# Patient Record
Sex: FEMALE | Race: WHITE | NOT HISPANIC OR LATINO
[De-identification: names, ages, dates, MRNs, and addresses within clinical notes are randomized per-mention and may not be internally consistent; named-entity substitution may affect disease eponyms.]

---

## 2019-11-08 ENCOUNTER — APPOINTMENT (OUTPATIENT)
Dept: GASTROENTEROLOGY | Facility: CLINIC | Age: 74
End: 2019-11-08
Payer: MEDICARE

## 2019-11-08 VITALS
BODY MASS INDEX: 23.9 KG/M2 | HEIGHT: 64 IN | HEART RATE: 78 BPM | SYSTOLIC BLOOD PRESSURE: 148 MMHG | DIASTOLIC BLOOD PRESSURE: 72 MMHG | WEIGHT: 140 LBS

## 2019-11-08 DIAGNOSIS — Z12.11 ENCOUNTER FOR SCREENING FOR MALIGNANT NEOPLASM OF COLON: ICD-10-CM

## 2019-11-08 PROBLEM — Z00.00 ENCOUNTER FOR PREVENTIVE HEALTH EXAMINATION: Status: ACTIVE | Noted: 2019-11-08

## 2019-11-08 PROCEDURE — 99204 OFFICE O/P NEW MOD 45 MIN: CPT

## 2019-11-08 NOTE — PHYSICAL EXAM
[General Appearance - Alert] : alert [General Appearance - In No Acute Distress] : in no acute distress [Sclera] : the sclera and conjunctiva were normal [Outer Ear] : the ears and nose were normal in appearance [] : no respiratory distress [Neck Appearance] : the appearance of the neck was normal [Abnormal Walk] : normal gait [Skin Color & Pigmentation] : normal skin color and pigmentation [Cranial Nerves] : cranial nerves 2-12 were intact [Oriented To Time, Place, And Person] : oriented to person, place, and time

## 2019-11-08 NOTE — ASSESSMENT
[FreeTextEntry1] : Will plan on an upper endoscopy for GERD.  Explained risks/benefits/alternatives including not limited to bleeding, infection, perforation, missed lesions, anesthesia complications.  Patient understands and agrees, all questions answered.\par \par Will plan on a colonoscopy for screening.  Explained risks/benefits/alternatives including not limited to bleeding, infection, perforation, missed lesions, anesthesia complications.  Patient understands and agrees, all questions answered.  Will use a split dose miralax/gatorade prep with clears the day prior.\par \par May need referral to Dr. Mathew based on EGD findings for evaluation of her dysphagia.

## 2019-11-08 NOTE — HISTORY OF PRESENT ILLNESS
[FreeTextEntry1] : Ms. Zuniga is a pleasant 74F no significant PMHx who presents to [facility] care.\balta whipple Has had a long h/o GERD, for which she has had two previous upper endoscopies, most recent more than 10 years ago, was told it was "normal." Has not taken anything for these symptoms.  Recently, over the past month or two, she has been awakening at night with regurgitation of stomach acid, this will wake her from sleep.  It will improve when she gets up and walks around.  She has associated heartburn with this.  No N/V, does complain of dysphagia occasionally with a sensation of food sticking in her upper throat.\par \par No weight loss.  No diarrhea or constipation, no rectal bleeding or black stool.\balta whipple Has never had a colonoscopy or other forms of colon cancer screening.\balta \par No NSAIDs or aspirin, has tried aspirin previously which has given her an upset stomach.\balta whipple Has not seen an ENT for her upper GI/airway symptoms.

## 2019-11-25 ENCOUNTER — RESULT REVIEW (OUTPATIENT)
Age: 74
End: 2019-11-25

## 2019-11-26 ENCOUNTER — APPOINTMENT (OUTPATIENT)
Dept: GASTROENTEROLOGY | Facility: HOSPITAL | Age: 74
End: 2019-11-26

## 2019-11-26 ENCOUNTER — OTHER (OUTPATIENT)
Age: 74
End: 2019-11-26

## 2023-12-26 ENCOUNTER — APPOINTMENT (OUTPATIENT)
Dept: GASTROENTEROLOGY | Facility: CLINIC | Age: 78
End: 2023-12-26
Payer: MEDICARE

## 2023-12-26 VITALS
HEIGHT: 64 IN | SYSTOLIC BLOOD PRESSURE: 148 MMHG | DIASTOLIC BLOOD PRESSURE: 70 MMHG | HEART RATE: 72 BPM | OXYGEN SATURATION: 98 % | BODY MASS INDEX: 24.07 KG/M2 | WEIGHT: 141 LBS

## 2023-12-26 DIAGNOSIS — K21.9 GASTRO-ESOPHAGEAL REFLUX DISEASE W/OUT ESOPHAGITIS: ICD-10-CM

## 2023-12-26 PROCEDURE — 99214 OFFICE O/P EST MOD 30 MIN: CPT

## 2023-12-26 RX ORDER — OMEPRAZOLE 20 MG/1
20 CAPSULE, DELAYED RELEASE ORAL
Qty: 60 | Refills: 1 | Status: ACTIVE | COMMUNITY
Start: 2023-12-26 | End: 1900-01-01

## 2023-12-26 RX ORDER — OMEPRAZOLE 20 MG/1
20 CAPSULE, DELAYED RELEASE ORAL
Qty: 60 | Refills: 1 | Status: DISCONTINUED | COMMUNITY
Start: 2019-11-26 | End: 2023-12-26

## 2023-12-26 RX ORDER — CHROMIUM 200 MCG
TABLET ORAL
Refills: 0 | Status: ACTIVE | COMMUNITY

## 2023-12-26 NOTE — ASSESSMENT
[FreeTextEntry1] : Likely GERD, r/o hiatal hernia. Will obtain an x-ray esophagram. Start omeprazole 20mg daily.  If unremarkable, would obtain a gastric emptying study.  Will be due for a colonoscopy in 2024 due to h/o polyp, will likely repeat an EGD at that time pending the above workup.

## 2023-12-26 NOTE — HISTORY OF PRESENT ILLNESS
[FreeTextEntry1] : Ms. Zuniga is a pleasant 78F h/o GERD, HTn, HLD, pre-DM who returns for f/u. Last seen 11/19 when she underwent an EGD/colonoscopy showing fundic gland polyps, esophageal biopsies c/w reflux, 1 sub-cm colonic tubular adenoma, int hemorrhoids.  Over the past 4 months or so she has had bothersome regurgitation mainly in the evenings, often waking her from sleep. She has had to sleep on a recliner which has helped. No symptoms during the day, progress mainly in the evening only. No overt heartburn, mainly just regurgitation. Does get occasional dull pain or soreness in her epigastric area. She has been trying to eat small meals throughout the day, has changed her diet, unsure if this has helped however has lost a few pounds. No dysphagia, odynophagia. Had an episode of diarrhea that lasted approximately 4 weeks several months ago, resolved, had stool studies which as per her report were unremarkable. No fevers or chills. Tried pantoprazole for 2 weeks or so, thinks her symptoms worsened. States was told in the very distant past she had a hiatal hernia.  Does not smoke, stopped drinking. Used to have a glass of wine in the evenings occasionally.

## 2024-01-05 DIAGNOSIS — K80.20 CALCULUS OF GALLBLADDER W/OUT CHOLECYSTITIS W/OUT OBSTRUCTION: ICD-10-CM

## 2024-01-12 ENCOUNTER — RESULT REVIEW (OUTPATIENT)
Age: 79
End: 2024-01-12

## 2024-03-21 ENCOUNTER — RX RENEWAL (OUTPATIENT)
Age: 79
End: 2024-03-21

## 2024-05-17 RX ORDER — SODIUM SULFATE, POTASSIUM SULFATE AND MAGNESIUM SULFATE 1.6; 3.13; 17.5 G/177ML; G/177ML; G/177ML
17.5-3.13-1.6 SOLUTION ORAL
Qty: 1 | Refills: 0 | Status: ACTIVE | COMMUNITY
Start: 2024-05-17 | End: 1900-01-01

## 2024-06-05 RX ORDER — FAMOTIDINE 40 MG/1
40 TABLET, FILM COATED ORAL
Qty: 30 | Refills: 1 | Status: ACTIVE | COMMUNITY
Start: 2024-01-18 | End: 1900-01-01

## 2024-06-25 ENCOUNTER — RESULT REVIEW (OUTPATIENT)
Age: 79
End: 2024-06-25

## 2024-06-26 ENCOUNTER — APPOINTMENT (OUTPATIENT)
Dept: GASTROENTEROLOGY | Facility: HOSPITAL | Age: 79
End: 2024-06-26

## 2024-08-06 ENCOUNTER — RX RENEWAL (OUTPATIENT)
Age: 79
End: 2024-08-06

## 2024-08-12 ENCOUNTER — NON-APPOINTMENT (OUTPATIENT)
Age: 79
End: 2024-08-12

## 2024-08-13 ENCOUNTER — APPOINTMENT (OUTPATIENT)
Dept: GASTROENTEROLOGY | Facility: CLINIC | Age: 79
End: 2024-08-13

## 2024-08-13 VITALS
HEART RATE: 83 BPM | SYSTOLIC BLOOD PRESSURE: 150 MMHG | WEIGHT: 144 LBS | OXYGEN SATURATION: 96 % | DIASTOLIC BLOOD PRESSURE: 68 MMHG | BODY MASS INDEX: 24.59 KG/M2 | HEIGHT: 64 IN

## 2024-08-13 DIAGNOSIS — K21.9 GASTRO-ESOPHAGEAL REFLUX DISEASE W/OUT ESOPHAGITIS: ICD-10-CM

## 2024-08-13 DIAGNOSIS — K80.20 CALCULUS OF GALLBLADDER W/OUT CHOLECYSTITIS W/OUT OBSTRUCTION: ICD-10-CM

## 2024-08-13 PROCEDURE — 99214 OFFICE O/P EST MOD 30 MIN: CPT

## 2024-08-13 NOTE — ASSESSMENT
[FreeTextEntry1] : Unclear if the symptoms are due to acid related disease or gallstones. Will restart omeprazole 20mg in the evening and stop famotidine. If no improvement after several weeks, will refer to surgery for evaluation for cholecystectomy.

## 2024-08-13 NOTE — HISTORY OF PRESENT ILLNESS
[FreeTextEntry1] : Ms. Zuniga is a 79F h/o GERD, gallstones who returns for f/u. She has been c/o bothersome pain in her epigastric area that wakes her from sleep at 1:30 AM every night, keeps her up for around 1 hour. No radiation. No heartburn, regurgitation. Does not occur after eating or other times in the day. Felt better previously while on omeprazole, thinks it got worse when changing to famotidine, unsure. Has been ongoing since 12/23.  U/S abd 1/24: fatty liver, gallstones  EGD/colonoscopy with myself 6/26/24: - duodenal erosions (unremarkable biopsies) - 1 mm cecal tubular adenoma - sigmoid hyperplastic polyps - diverticulosis - int hemorrhoids

## 2024-09-05 ENCOUNTER — NON-APPOINTMENT (OUTPATIENT)
Age: 79
End: 2024-09-05

## 2024-09-12 ENCOUNTER — APPOINTMENT (OUTPATIENT)
Dept: SURGERY | Facility: CLINIC | Age: 79
End: 2024-09-12
Payer: MEDICARE

## 2024-09-12 VITALS
HEIGHT: 64 IN | TEMPERATURE: 98.2 F | HEART RATE: 87 BPM | SYSTOLIC BLOOD PRESSURE: 149 MMHG | DIASTOLIC BLOOD PRESSURE: 57 MMHG | BODY MASS INDEX: 24.21 KG/M2 | OXYGEN SATURATION: 97 % | WEIGHT: 141.8 LBS

## 2024-09-12 DIAGNOSIS — K80.20 CALCULUS OF GALLBLADDER W/OUT CHOLECYSTITIS W/OUT OBSTRUCTION: ICD-10-CM

## 2024-09-12 PROCEDURE — 99203 OFFICE O/P NEW LOW 30 MIN: CPT

## 2024-09-12 NOTE — PHYSICAL EXAM
[de-identified] : Patient looks well and does not appear her stated age [de-identified] : Her eyes are not jaundiced [de-identified] : Her abdomen is soft nontender she has well-healed laparoscopy incisions

## 2024-09-12 NOTE — DATA REVIEWED
[FreeTextEntry1] : I looked at the actual images of the ultrasound as well as the upper and lower endoscopy  Laboratory tests are normal

## 2024-09-12 NOTE — HISTORY OF PRESENT ILLNESS
[de-identified] : Patient is a 79-year-old woman who looks remarkably well for her age who is in relatively good condition who is complaining of epigastric pressure that feels like somebody has kicked her in the stomach at the subxiphoid area.  It happens at 1:30 AM and wakes her up from sleep she usually eats dinner around 6-7 but sometimes as late as 8  Other times a day she does not seem to have this pain has never had any jaundice never had any pancreatitis and she had a recent upper and lower endoscopy that did not yield any other culprits for this epigastric pain  Patient spends her mid velasco in [place] and last year had her appendix taken out down there this was done laparoscopically she is also had carpal tunnel surgery and orthopedic surgery for a fall on her left arm.   Her recent LFTs done during her physical exam at [facility] were normal

## 2024-09-12 NOTE — REASON FOR VISIT
[Consultation] : a consultation visit [FreeTextEntry1] : Patient referred by Dr. Nilesh Haney for the evaluation of symptomatic cholelithiasis

## 2024-09-12 NOTE — PLAN
[FreeTextEntry1] : Patient with symptomatic cholelithiasis I have recommended laparoscopic cholecystectomy  She is going to think about it but she certainly understands what my recommendation is and I will see her back as needed or at the time of surgery based on her choice  I spent 30 minutes with the patient and reviewing her records and documenting my findings

## 2024-09-12 NOTE — ASSESSMENT
[FreeTextEntry1] : My impression is this patient has symptomatic cholelithiasis.  Her symptoms are not textbook but they really are she seems to me on inspection of the ultrasound that she has a single large stone with may be a smaller but still large stone so I think her chances of passing stones is low  She seems like she wants to think about whether or not she wants to have her gallbladder taken out electively as she thinks her symptoms have gone away for the time being I did talk to her about emergency surgery and acute cholecystitis but she is going to make up her own mind after she gets back from [place] at the end of the month  I do not think she needs any further imaging and she does have recent liver function tests and no suspicious findings so I believe we could proceed with surgery if she desires it

## 2024-10-01 ENCOUNTER — RX RENEWAL (OUTPATIENT)
Age: 79
End: 2024-10-01

## 2024-12-09 ENCOUNTER — NON-APPOINTMENT (OUTPATIENT)
Age: 79
End: 2024-12-09

## 2025-01-02 ENCOUNTER — APPOINTMENT (OUTPATIENT)
Dept: SURGERY | Facility: CLINIC | Age: 80
End: 2025-01-02
Payer: MEDICARE

## 2025-01-02 ENCOUNTER — NON-APPOINTMENT (OUTPATIENT)
Age: 80
End: 2025-01-02

## 2025-01-02 VITALS
DIASTOLIC BLOOD PRESSURE: 60 MMHG | SYSTOLIC BLOOD PRESSURE: 170 MMHG | TEMPERATURE: 98.1 F | HEART RATE: 90 BPM | OXYGEN SATURATION: 96 %

## 2025-01-02 DIAGNOSIS — K80.20 CALCULUS OF GALLBLADDER W/OUT CHOLECYSTITIS W/OUT OBSTRUCTION: ICD-10-CM

## 2025-01-02 PROCEDURE — 99213 OFFICE O/P EST LOW 20 MIN: CPT

## 2025-01-03 LAB
ALBUMIN SERPL ELPH-MCNC: 4.4 G/DL
ALP BLD-CCNC: 89 U/L
ALT SERPL-CCNC: 12 U/L
ANION GAP SERPL CALC-SCNC: 10 MMOL/L
AST SERPL-CCNC: 14 U/L
BILIRUB SERPL-MCNC: 0.3 MG/DL
BUN SERPL-MCNC: 13 MG/DL
CALCIUM SERPL-MCNC: 9.2 MG/DL
CHLORIDE SERPL-SCNC: 105 MMOL/L
CO2 SERPL-SCNC: 25 MMOL/L
CREAT SERPL-MCNC: 0.92 MG/DL
EGFR: 63 ML/MIN/1.73M2
GLUCOSE SERPL-MCNC: 101 MG/DL
HCT VFR BLD CALC: 47.3 %
HGB BLD-MCNC: 14.5 G/DL
LPL SERPL-CCNC: 49 U/L
MCHC RBC-ENTMCNC: 27.6 PG
MCHC RBC-ENTMCNC: 30.7 G/DL
MCV RBC AUTO: 89.9 FL
PLATELET # BLD AUTO: 271 K/UL
POTASSIUM SERPL-SCNC: 4.1 MMOL/L
PROT SERPL-MCNC: 7 G/DL
RBC # BLD: 5.26 M/UL
RBC # FLD: 14.2 %
SODIUM SERPL-SCNC: 141 MMOL/L
WBC # FLD AUTO: 10.66 K/UL

## 2025-01-15 ENCOUNTER — NON-APPOINTMENT (OUTPATIENT)
Age: 80
End: 2025-01-15

## 2025-01-21 ENCOUNTER — NON-APPOINTMENT (OUTPATIENT)
Age: 80
End: 2025-01-21

## 2025-01-22 ENCOUNTER — RESULT REVIEW (OUTPATIENT)
Age: 80
End: 2025-01-22

## 2025-01-31 ENCOUNTER — TRANSCRIPTION ENCOUNTER (OUTPATIENT)
Age: 80
End: 2025-01-31

## 2025-01-31 ENCOUNTER — APPOINTMENT (OUTPATIENT)
Dept: SURGERY | Facility: HOSPITAL | Age: 80
End: 2025-01-31

## 2025-01-31 ENCOUNTER — RESULT REVIEW (OUTPATIENT)
Age: 80
End: 2025-01-31

## 2025-02-10 ENCOUNTER — NON-APPOINTMENT (OUTPATIENT)
Age: 80
End: 2025-02-10

## 2025-02-12 ENCOUNTER — RX RENEWAL (OUTPATIENT)
Age: 80
End: 2025-02-12

## 2025-02-19 ENCOUNTER — APPOINTMENT (OUTPATIENT)
Dept: SURGERY | Facility: CLINIC | Age: 80
End: 2025-02-19
Payer: MEDICARE

## 2025-02-19 VITALS
TEMPERATURE: 98 F | DIASTOLIC BLOOD PRESSURE: 61 MMHG | SYSTOLIC BLOOD PRESSURE: 164 MMHG | OXYGEN SATURATION: 97 % | HEART RATE: 77 BPM

## 2025-02-19 DIAGNOSIS — K80.20 CALCULUS OF GALLBLADDER W/OUT CHOLECYSTITIS W/OUT OBSTRUCTION: ICD-10-CM

## 2025-02-19 PROCEDURE — 99024 POSTOP FOLLOW-UP VISIT: CPT

## 2025-03-04 ENCOUNTER — NON-APPOINTMENT (OUTPATIENT)
Age: 80
End: 2025-03-04

## 2025-03-04 RX ORDER — SUCRALFATE 1 G/10ML
1 SUSPENSION ORAL EVERY 6 HOURS
Qty: 1 | Refills: 0 | Status: ACTIVE | COMMUNITY
Start: 2025-03-04 | End: 1900-01-01

## 2025-03-24 ENCOUNTER — RESULT REVIEW (OUTPATIENT)
Age: 80
End: 2025-03-24

## 2025-03-25 LAB
ALBUMIN SERPL ELPH-MCNC: 4.2 G/DL
ALP BLD-CCNC: 86 U/L
ALT SERPL-CCNC: 12 U/L
ANION GAP SERPL CALC-SCNC: 10 MMOL/L
AST SERPL-CCNC: 13 U/L
BILIRUB SERPL-MCNC: 0.4 MG/DL
BUN SERPL-MCNC: 10 MG/DL
CALCIUM SERPL-MCNC: 9.4 MG/DL
CHLORIDE SERPL-SCNC: 105 MMOL/L
CO2 SERPL-SCNC: 26 MMOL/L
CREAT SERPL-MCNC: 1 MG/DL
EGFRCR SERPLBLD CKD-EPI 2021: 57 ML/MIN/1.73M2
GLUCOSE SERPL-MCNC: 103 MG/DL
LPL SERPL-CCNC: 44 U/L
POTASSIUM SERPL-SCNC: 4.2 MMOL/L
PROT SERPL-MCNC: 7.1 G/DL
SODIUM SERPL-SCNC: 141 MMOL/L

## 2025-04-02 ENCOUNTER — TRANSCRIPTION ENCOUNTER (OUTPATIENT)
Age: 80
End: 2025-04-02

## 2025-04-02 ENCOUNTER — APPOINTMENT (OUTPATIENT)
Dept: SURGERY | Facility: CLINIC | Age: 80
End: 2025-04-02
Payer: MEDICARE

## 2025-04-02 VITALS
TEMPERATURE: 97.8 F | SYSTOLIC BLOOD PRESSURE: 143 MMHG | DIASTOLIC BLOOD PRESSURE: 68 MMHG | HEART RATE: 78 BPM | OXYGEN SATURATION: 98 %

## 2025-04-02 DIAGNOSIS — K21.9 GASTRO-ESOPHAGEAL REFLUX DISEASE W/OUT ESOPHAGITIS: ICD-10-CM

## 2025-04-02 PROCEDURE — 99024 POSTOP FOLLOW-UP VISIT: CPT

## 2025-04-19 ENCOUNTER — NON-APPOINTMENT (OUTPATIENT)
Age: 80
End: 2025-04-19

## 2025-04-22 ENCOUNTER — APPOINTMENT (OUTPATIENT)
Dept: UROLOGY | Facility: CLINIC | Age: 80
End: 2025-04-22
Payer: MEDICARE

## 2025-04-22 VITALS — HEIGHT: 64 IN | BODY MASS INDEX: 23.22 KG/M2 | WEIGHT: 136 LBS

## 2025-04-22 DIAGNOSIS — N20.0 CALCULUS OF KIDNEY: ICD-10-CM

## 2025-04-22 PROCEDURE — 99204 OFFICE O/P NEW MOD 45 MIN: CPT

## 2025-04-22 RX ORDER — TAMSULOSIN HYDROCHLORIDE 0.4 MG/1
0.4 CAPSULE ORAL
Qty: 30 | Refills: 0 | Status: ACTIVE | COMMUNITY
Start: 2025-04-22 | End: 1900-01-01

## 2025-04-22 RX ORDER — CRANBERRY FRUIT 1000 MG
1000 CAPSULE ORAL
Refills: 0 | Status: ACTIVE | COMMUNITY

## 2025-04-22 RX ORDER — AMLODIPINE BESYLATE AND BENAZEPRIL HYDROCHLORIDE 2.5; 1 MG/1; MG/1
2.5-1 CAPSULE ORAL
Refills: 0 | Status: ACTIVE | COMMUNITY

## 2025-04-23 LAB
ANION GAP SERPL CALC-SCNC: 15 MMOL/L
BUN SERPL-MCNC: 9 MG/DL
CALCIUM SERPL-MCNC: 9.6 MG/DL
CHLORIDE SERPL-SCNC: 105 MMOL/L
CO2 SERPL-SCNC: 22 MMOL/L
CREAT SERPL-MCNC: 1 MG/DL
EGFRCR SERPLBLD CKD-EPI 2021: 57 ML/MIN/1.73M2
GLUCOSE SERPL-MCNC: 99 MG/DL
HCT VFR BLD CALC: 47.2 %
HGB BLD-MCNC: 14.9 G/DL
MCHC RBC-ENTMCNC: 27.7 PG
MCHC RBC-ENTMCNC: 31.6 G/DL
MCV RBC AUTO: 87.9 FL
PLATELET # BLD AUTO: 304 K/UL
POTASSIUM SERPL-SCNC: 4.2 MMOL/L
RBC # BLD: 5.37 M/UL
RBC # FLD: 14.1 %
SODIUM SERPL-SCNC: 143 MMOL/L
WBC # FLD AUTO: 12.05 K/UL

## 2025-04-24 ENCOUNTER — RESULT REVIEW (OUTPATIENT)
Age: 80
End: 2025-04-24

## 2025-04-25 ENCOUNTER — NON-APPOINTMENT (OUTPATIENT)
Age: 80
End: 2025-04-25

## 2025-04-29 ENCOUNTER — NON-APPOINTMENT (OUTPATIENT)
Age: 80
End: 2025-04-29

## 2025-06-16 ENCOUNTER — APPOINTMENT (OUTPATIENT)
Dept: GASTROENTEROLOGY | Facility: CLINIC | Age: 80
End: 2025-06-16
Payer: MEDICARE

## 2025-06-16 VITALS
BODY MASS INDEX: 23.56 KG/M2 | SYSTOLIC BLOOD PRESSURE: 140 MMHG | HEIGHT: 64 IN | DIASTOLIC BLOOD PRESSURE: 62 MMHG | WEIGHT: 138 LBS

## 2025-06-16 PROCEDURE — 99213 OFFICE O/P EST LOW 20 MIN: CPT

## 2025-06-16 PROCEDURE — G2211 COMPLEX E/M VISIT ADD ON: CPT

## 2025-07-31 ENCOUNTER — APPOINTMENT (OUTPATIENT)
Dept: UROLOGY | Facility: CLINIC | Age: 80
End: 2025-07-31
Payer: MEDICARE

## 2025-07-31 ENCOUNTER — RX RENEWAL (OUTPATIENT)
Age: 80
End: 2025-07-31

## 2025-07-31 VITALS — HEIGHT: 64 IN | BODY MASS INDEX: 23.05 KG/M2 | WEIGHT: 135 LBS

## 2025-07-31 DIAGNOSIS — N28.1 CYST OF KIDNEY, ACQUIRED: ICD-10-CM

## 2025-07-31 DIAGNOSIS — N39.0 URINARY TRACT INFECTION, SITE NOT SPECIFIED: ICD-10-CM

## 2025-07-31 DIAGNOSIS — N81.10 CYSTOCELE, UNSPECIFIED: ICD-10-CM

## 2025-07-31 DIAGNOSIS — N20.0 CALCULUS OF KIDNEY: ICD-10-CM

## 2025-07-31 PROCEDURE — 99213 OFFICE O/P EST LOW 20 MIN: CPT

## 2025-08-04 LAB — BACTERIA UR CULT: ABNORMAL
